# Patient Record
Sex: FEMALE | Race: WHITE | Employment: UNEMPLOYED | URBAN - METROPOLITAN AREA
[De-identification: names, ages, dates, MRNs, and addresses within clinical notes are randomized per-mention and may not be internally consistent; named-entity substitution may affect disease eponyms.]

---

## 2020-03-15 ENCOUNTER — HOSPITAL ENCOUNTER (EMERGENCY)
Age: 25
Discharge: HOME | End: 2020-03-15
Attending: EMERGENCY MEDICINE
Payer: MEDICAID

## 2020-03-15 VITALS
DIASTOLIC BLOOD PRESSURE: 88 MMHG | SYSTOLIC BLOOD PRESSURE: 154 MMHG | TEMPERATURE: 98.4 F | HEART RATE: 92 BPM | OXYGEN SATURATION: 98 % | RESPIRATION RATE: 16 BRPM

## 2020-03-15 DIAGNOSIS — N90.7 LABIAL CYST: Primary | ICD-10-CM

## 2020-03-15 PROCEDURE — 99283 EMERGENCY DEPT VISIT LOW MDM: CPT

## 2020-03-16 NOTE — ED NOTES
I have reviewed discharge instructions with the patient. The patient verbalized understanding. Patient left ED via Discharge Method: ambulatory to Home with . Opportunity for questions and clarification provided. Patient given 0 scripts. To continue your aftercare when you leave the hospital, you may receive an automated call from our care team to check in on how you are doing. This is a free service and part of our promise to provide the best care and service to meet your aftercare needs.  If you have questions, or wish to unsubscribe from this service please call [phone]. Thank you for Choosing our [facility] Emergency Department.

## 2020-03-16 NOTE — DISCHARGE INSTRUCTIONS
Follow-up with gynecology at previously scheduled appointment. Continue sits baths. No sexual activity. Return for worsening or concerning symptoms.

## 2020-03-16 NOTE — ED PROVIDER NOTES
45-year-old female with a history of lupus presents with mass on her labia. She thinks it is related to her  using a latex condom and she reports a sensitivity to latex. It is been on there for 3 days and has gradually worsened. She has burning with urination. Her doctor put her on isolation for COVID-19 reportedly because she has a \"suppressed immune system\" and a roommate that no longer lives with her [place] have been exposed to a patient at a nursing home with [facility]. \"  interestingly, she states she has no contact with these roommates now and they no longer live with her so she cannot follow-up to see if this patient tested positive or not. Also, states that she has not been tested herself. She has been off of Imuran for 2 months. She reports frequent pneumonia and has had recent cough and fevers that have now resolved. She now reports a dry cough and occasional shortness of breath. Old chart was reviewed. She was actually seen at urgent care TODAY for vaginal cyst and referred to gynecology. She has an appointment March 19. She was treated for vaginal yeast infection. She was also seen by her primary care physician March 10 and at that time told her doctor she was exposed to somebody at a plant in [place], but did not have direct exposure. She had a normal chest x-ray and a full viral panel that was negative. She then was [facility] at [facility] 11 for shortness of breath and had another extensive work-up that was unremarkable. CXR 3/10:  IMPRESSION:  NO RADIOGRAPHIC EVIDENCE OF ACUTE CARDIOPULMONARY DISEASE               Past Medical History:   Diagnosis Date    Psychiatric disorder     bi polar; adhd; reactive attachment disorder, anxiety       Past Surgical History:   Procedure Laterality Date    HX HEENT      wisdom teeth         No family history on file.     Social History     Socioeconomic History    Marital status: SINGLE     Spouse name: Not on file    Number of children: Not on file    Years of education: Not on file    Highest education level: Not on file   Occupational History    Not on file   Social Needs    Financial resource strain: Not on file    Food insecurity     Worry: Not on file     Inability: Not on file    Transportation needs     Medical: Not on file     Non-medical: Not on file   Tobacco Use    Smoking status: Never Smoker   Substance and Sexual Activity    Alcohol use: No    Drug use: No    Sexual activity: Not on file   Lifestyle    Physical activity     Days per week: Not on file     Minutes per session: Not on file    Stress: Not on file   Relationships    Social connections     Talks on phone: Not on file     Gets together: Not on file     Attends Tenri[employer] service: Not on file     Active member of club or organization: Not on file     Attends meetings of clubs or organizations: Not on file     Relationship status: Not on file    Intimate partner violence     Fear of current or ex partner: Not on file     Emotionally abused: Not on file     Physically abused: Not on file     Forced sexual activity: Not on file   Other Topics Concern    Not on file   Social History Narrative    Not on file         ALLERGIES: Latex; Lithium; and Topamax [topiramate]    Review of Systems   Constitutional: Negative for chills and fever. HENT: Negative for hearing loss. Eyes: Negative for visual disturbance. Respiratory: Positive for cough and shortness of breath. Cardiovascular: Negative for chest pain and palpitations. Gastrointestinal: Negative for abdominal pain, diarrhea, nausea and vomiting. Genitourinary: Positive for vaginal pain. Musculoskeletal: Negative for back pain. Skin: Negative for rash. Neurological: Negative for weakness and headaches. Psychiatric/Behavioral: Negative for confusion.        Vitals:    03/15/20 2219   BP: (!) 164/103   Pulse: (!) 103   Resp: 16   Temp: 98.6 °F (37 °C)   SpO2: 98%            Physical Exam  Vitals signs and nursing note reviewed. Constitutional:       Appearance: She is well-developed. She is obese. HENT:      Head: Normocephalic and atraumatic. Eyes:      Pupils: Pupils are equal, round, and reactive to light. Neck:      Musculoskeletal: Normal range of motion and neck supple. Cardiovascular:      Rate and Rhythm: Regular rhythm. Heart sounds: Normal heart sounds. Pulmonary:      Effort: Pulmonary effort is normal.      Breath sounds: Normal breath sounds. Abdominal:      Palpations: Abdomen is soft. Tenderness: There is no abdominal tenderness. Genitourinary:     Labia:         Left: Tenderness and lesion present. Musculoskeletal: Normal range of motion. Skin:     General: Skin is warm and dry. Neurological:      Mental Status: She is alert. Psychiatric:         Behavior: Behavior normal.          MDM  Number of Diagnoses or Management Options  Diagnosis management comments: Parts of this document were created using dragon voice recognition software. The chart has been reviewed but errors may still be present. No abscess to drain. Advised to keep follow-up appointment with gynecology and to continue sitz bath's. I discussed the results of all labs, procedures, radiographs, and treatments with the patient and available family. Treatment plan is agreed upon and the patient is ready for discharge. Questions about treatment in the ED and differential diagnosis of presenting condition were answered. Patient was given verbal discharge instructions including, but not limited to, importance of returning to the emergency department for any concern of worsening or continued symptoms. Instructions were given to follow up with a primary care provider or specialist within 1-2 days.   Adverse effects of medications, if prescribed, were discussed and patient was advised to refrain from significant physical activity until followed up by primary care physician and to not drive or operate heavy machinery after taking any sedating substances.              Procedures

## 2020-03-16 NOTE — ED TRIAGE NOTES
Pt arrives via POV from home for complaint of abcess on her labia majora. Pt has been told to self isolate for 14 days by her primary care provider due to fever, SOB, and cough.

## 2023-08-18 PROCEDURE — 99283 EMERGENCY DEPT VISIT LOW MDM: CPT

## 2023-08-18 ASSESSMENT — PAIN - FUNCTIONAL ASSESSMENT: PAIN_FUNCTIONAL_ASSESSMENT: 0-10

## 2023-08-18 ASSESSMENT — PAIN DESCRIPTION - LOCATION: LOCATION: ANKLE

## 2023-08-18 ASSESSMENT — PAIN SCALES - GENERAL: PAINLEVEL_OUTOF10: 9

## 2023-08-18 ASSESSMENT — PAIN DESCRIPTION - ORIENTATION: ORIENTATION: RIGHT

## 2023-08-19 ENCOUNTER — HOSPITAL ENCOUNTER (EMERGENCY)
Age: 28
Discharge: HOME | End: 2023-08-19
Attending: EMERGENCY MEDICINE
Payer: COMMERCIAL

## 2023-08-19 ENCOUNTER — APPOINTMENT (OUTPATIENT)
Dept: GENERAL RADIOLOGY | Age: 28
End: 2023-08-19
Payer: COMMERCIAL

## 2023-08-19 VITALS
HEIGHT: 66 IN | WEIGHT: 253 LBS | TEMPERATURE: 99 F | BODY MASS INDEX: 40.66 KG/M2 | HEART RATE: 95 BPM | OXYGEN SATURATION: 98 % | DIASTOLIC BLOOD PRESSURE: 84 MMHG | SYSTOLIC BLOOD PRESSURE: 124 MMHG | RESPIRATION RATE: 17 BRPM

## 2023-08-19 DIAGNOSIS — S93.401A SPRAIN OF RIGHT ANKLE, UNSPECIFIED LIGAMENT, INITIAL ENCOUNTER: Primary | ICD-10-CM

## 2023-08-19 PROCEDURE — 73610 X-RAY EXAM OF ANKLE: CPT

## 2023-08-19 RX ORDER — IBUPROFEN 600 MG/1
600 TABLET ORAL EVERY 8 HOURS PRN
Qty: 30 TABLET | Refills: 0 | Status: SHIPPED | OUTPATIENT
Start: 2023-08-19 | End: 2023-08-29

## 2023-08-19 NOTE — DISCHARGE INSTRUCTIONS
RICE: Rest, Ice, Compression & Elevation to decrease swelling to the affected area and help control pain. Use the NSAIDs as prescribed with food for mild to moderate pain.
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
## 2023-08-19 NOTE — ED PROVIDER NOTES
as needed for Pain        Past Medical History:   Diagnosis Date    Psychiatric disorder     bi polar; adhd; reactive attachment disorder, anxiety        Past Surgical History:   Procedure Laterality Date    HEENT      wisdom teeth        Social History     Socioeconomic History    Marital status: Legally    Tobacco Use    Smoking status: Never   Substance and Sexual Activity    Alcohol use: No    Drug use: No        Previous Medications    BUSPIRONE (BUSPAR) 15 MG TABLET    Take 15 mg by mouth    CYCLOBENZAPRINE (FLEXERIL) 10 MG TABLET    Take 10 mg by mouth 3 times daily as needed    GUANFACINE (INTUNIV) 4 MG TB24 EXTENDED RELEASE TABLET    Take 4 mg by mouth    LAMOTRIGINE (LAMICTAL) 100 MG TABLET    Take 100 mg by mouth daily    LEVONORGESTREL (MIRENA) IUD 52 MG    1 each by IntraUTERine route once    NAPROXEN SODIUM (ANAPROX) 550 MG TABLET    Take 550 mg by mouth 3 times daily (with meals)    OXCARBAZEPINE (TRILEPTAL) 150 MG TABLET    Take 150 mg by mouth 2 times daily    QUETIAPINE (SEROQUEL) 25 MG TABLET    Take 125 mg by mouth        Results for orders placed or performed during the hospital encounter of 87/41/48   Splint Application    Narrative    Jeannie Ojeda DO     8/19/2023  2:88 AM  Splint Application    Date/Time: 8/19/2023 2:22 AM  Performed by: Jeannie Ojeda DO  Authorized by: Jeannie Ojeda DO     Consent:     Consent obtained:  Verbal    Consent given by:  Patient    Risks discussed:  Discoloration, numbness, pain and swelling    Alternatives discussed:  No treatment and alternative treatment  Pre-procedure details:     Distal neurologic exam:  Normal  Procedure details:     Location:  Ankle    Ankle location:  R ankle    Supplies:  Prefabricated splint  Post-procedure details:     Distal neurologic exam:  Normal    Procedure completion:  Tolerated well, no immediate complications   XR ANKLE RIGHT (MIN 3 VIEWS)    Narrative    EXAMINATION: 3 views right ankle.     Date: 8/19/2023. Comparison: None available. Clinical History: Trauma. Findings: There is no fracture, subluxation, or dislocation. The joint spaces are within normal limits. Impression    Impression:    No fracture, subluxation, or dislocation. Danielle Bernabe D.O.   8/19/2023 12:57:00 AM        XR ANKLE RIGHT (MIN 3 VIEWS)   Final Result   Impression:      No fracture, subluxation, or dislocation. Danielle Bernabe D.O.    8/19/2023 12:57:00 AM                        Voice dictation software was used during the making of this note. This software is not perfect and grammatical and other typographical errors may be present. This note has not been completely proofread for errors.       Katerina Ferrari, DO  08/19/23 [address] [place] [address],   08/19/23 8740